# Patient Record
Sex: MALE | Race: WHITE | Employment: FULL TIME | URBAN - METROPOLITAN AREA
[De-identification: names, ages, dates, MRNs, and addresses within clinical notes are randomized per-mention and may not be internally consistent; named-entity substitution may affect disease eponyms.]

---

## 2019-01-03 ENCOUNTER — HOSPITAL ENCOUNTER (EMERGENCY)
Age: 49
Discharge: HOME | End: 2019-01-03
Attending: EMERGENCY MEDICINE
Payer: COMMERCIAL

## 2019-01-03 VITALS
WEIGHT: 200 LBS | DIASTOLIC BLOOD PRESSURE: 115 MMHG | SYSTOLIC BLOOD PRESSURE: 169 MMHG | HEIGHT: 69 IN | HEART RATE: 87 BPM | BODY MASS INDEX: 29.62 KG/M2 | RESPIRATION RATE: 18 BRPM | TEMPERATURE: 98.5 F | OXYGEN SATURATION: 96 %

## 2019-01-03 DIAGNOSIS — S81.811A LACERATION OF RIGHT LOWER EXTREMITY, INITIAL ENCOUNTER: Primary | ICD-10-CM

## 2019-01-03 PROCEDURE — 99283 EMERGENCY DEPT VISIT LOW MDM: CPT

## 2019-01-03 PROCEDURE — 74011000250 HC RX REV CODE- 250: Performed by: PHYSICIAN ASSISTANT

## 2019-01-03 PROCEDURE — 74011250636 HC RX REV CODE- 250/636: Performed by: PHYSICIAN ASSISTANT

## 2019-01-03 PROCEDURE — 77030008460 HC STPLR SKN PRECIS 3M -A

## 2019-01-03 PROCEDURE — 77030018836 HC SOL IRR NACL ICUM -A

## 2019-01-03 PROCEDURE — 90471 IMMUNIZATION ADMIN: CPT

## 2019-01-03 PROCEDURE — 90715 TDAP VACCINE 7 YRS/> IM: CPT | Performed by: PHYSICIAN ASSISTANT

## 2019-01-03 PROCEDURE — 75810000294 HC INTERM/LAYERED WND RPR

## 2019-01-03 PROCEDURE — 77030031139 HC SUT VCRL2 J&J -A

## 2019-01-03 RX ORDER — NAPROXEN 500 MG/1
500 TABLET ORAL 2 TIMES DAILY WITH MEALS
Qty: 20 TAB | Refills: 0 | Status: SHIPPED | OUTPATIENT
Start: 2019-01-03

## 2019-01-03 RX ORDER — LIDOCAINE HYDROCHLORIDE AND EPINEPHRINE 10; 10 MG/ML; UG/ML
10 INJECTION, SOLUTION INFILTRATION; PERINEURAL ONCE
Status: COMPLETED | OUTPATIENT
Start: 2019-01-03 | End: 2019-01-03

## 2019-01-03 RX ORDER — SULFAMETHOXAZOLE AND TRIMETHOPRIM 800; 160 MG/1; MG/1
1 TABLET ORAL 2 TIMES DAILY
Qty: 14 TAB | Refills: 0 | Status: SHIPPED | OUTPATIENT
Start: 2019-01-03 | End: 2019-01-10

## 2019-01-03 RX ADMIN — LIDOCAINE HYDROCHLORIDE AND EPINEPHRINE 100 MG: 10; 10 INJECTION, SOLUTION INFILTRATION; PERINEURAL at 15:50

## 2019-01-03 RX ADMIN — TETANUS TOXOID, REDUCED DIPHTHERIA TOXOID AND ACELLULAR PERTUSSIS VACCINE, ADSORBED 0.5 ML: 5; 2.5; 8; 8; 2.5 SUSPENSION INTRAMUSCULAR at 15:49

## 2019-01-03 NOTE — ED TRIAGE NOTES
Patient presents to the ED with c/o stab wound around 3 pm today to his left thigh. Pt accidentally cut himself with a knife while trying to cut some wires. Pt is unsure if he is up to date on his tetanus. Pt denies taking any medications.

## 2019-01-03 NOTE — ED NOTES
Emergency Department Nursing Plan of Care       The Nursing Plan of Care is developed from the Nursing assessment and Emergency Department Attending provider initial evaluation. The plan of care may be reviewed in the ED Provider note.     The Plan of Care was developed with the following considerations:   Patient / Family readiness to learn indicated by:verbalized understanding  Persons(s) to be included in education: patient  Barriers to Learning/Limitations:No    Signed     Nuvia Wilhelm RN    1/3/2019   4:03 PM

## 2019-01-03 NOTE — ED PROVIDER NOTES
EMERGENCY DEPARTMENT HISTORY AND PHYSICAL EXAM      Date: 1/3/2019  Patient Name: Amado Christina    History of Presenting Illness     Chief Complaint   Patient presents with    Laceration       History Provided By: Patient    HPI: Amado Christina, 46 y.o. male with PMHx significant fo, presents ambulatory to the ED with cc of wound to left thigh that occurred about 30 min PTA. Pt states he was cutting cords with a knife and cut thigh. Denies pain currently. Reports sharp pain with movement. Denies numbness/tingling, weakness. Has not taken anything for sx. There are no other complaints, changes, or physical findings at this time. PCP: Collins Smith NP    No current facility-administered medications on file prior to encounter. No current outpatient medications on file prior to encounter. Past History     Past Medical History:  History reviewed. No pertinent past medical history. Past Surgical History:  History reviewed. No pertinent surgical history. Family History:  History reviewed. No pertinent family history. Social History:  Social History     Tobacco Use    Smoking status: Never Smoker    Smokeless tobacco: Never Used   Substance Use Topics    Alcohol use: Yes    Drug use: No       Allergies:  No Known Allergies      Review of Systems   Review of Systems   Constitutional: Negative for chills and fever. HENT: Negative for facial swelling. Eyes: Negative for photophobia and visual disturbance. Respiratory: Negative for shortness of breath. Cardiovascular: Negative for chest pain. Gastrointestinal: Negative for abdominal pain, nausea and vomiting. Genitourinary: Negative for flank pain. Skin: Positive for wound. Negative for color change, pallor and rash. Neurological: Negative for dizziness, weakness, light-headedness and headaches. All other systems reviewed and are negative.       Physical Exam   Physical Exam   Constitutional: He is oriented to person, place, and time. He appears well-developed and well-nourished. No distress. HENT:   Head: Normocephalic and atraumatic. Eyes: Conjunctivae are normal.   Cardiovascular: Normal rate, regular rhythm and normal heart sounds. Pulmonary/Chest: Effort normal and breath sounds normal. No respiratory distress. Abdominal: Soft. Bowel sounds are normal. There is no tenderness. Musculoskeletal: Normal range of motion. Neurological: He is alert and oriented to person, place, and time. No cranial nerve deficit. Skin: Skin is warm. No rash noted. 4 cm linear laceration to left lateral thigh. 1 cm laceration through vastus lateralis. Bleeding controlled. No FB visualized. Full AROM intact. Sensation intact. Psychiatric: He has a normal mood and affect. His behavior is normal.   Nursing note and vitals reviewed. Diagnostic Study Results     Labs -   No results found for this or any previous visit (from the past 12 hour(s)). Radiologic Studies -   No orders to display     CT Results  (Last 48 hours)    None        CXR Results  (Last 48 hours)    None            Medical Decision Making   I am the first provider for this patient. I reviewed the vital signs, available nursing notes, past medical history, past surgical history, family history and social history. Vital Signs-Reviewed the patient's vital signs. Patient Vitals for the past 12 hrs:   Temp Pulse Resp BP SpO2   01/03/19 1513 98.5 °F (36.9 °C) 87 18 (!) 169/115 96 %         Records Reviewed: Nursing Notes and Old Medical Records    Provider Notes (Medical Decision Making):   DDx: Laceration, Muscle involvement, FB in wound    ED Course:   Initial assessment performed. The patients presenting problems have been discussed, and they are in agreement with the care plan formulated and outlined with them. I have encouraged them to ask questions as they arise throughout their visit.        WOUND REPAIR  Date/Time: 1/3/2019 3:40 PM  Performed by: [id number] provider: Dr. Guevara Jewel  Preparation: skin prepped with Betadine  Location: left lateral thigh. Wound length: 4 cm. Anesthesia: local infiltration    Anesthesia:  Local Anesthetic: lidocaine 1% without epinephrine  Anesthetic total: 8 mL  Foreign bodies: no foreign bodies  Irrigation solution: saline  Irrigation method: jet lavage  Debridement: none  Subcutaneous closure: Vicryl (4-0, 3 sutures)  Number of sutures: 6 staples. Technique: simple and interrupted  Approximation: close  Dressing: 4x4  Patient tolerance: Patient tolerated the procedure well with no immediate complications  My total time at bedside, performing this procedure was 31-45 minutes. Disposition:  Discussed dx and treatment plan. Discussed importance of follow up in 7 days for staple removal. All questions answered. Pt voiced they understood. Return if sx worsen. PLAN:  1. Discharge Medication List as of 1/3/2019  5:26 PM        2. Follow-up Information     Follow up With Specialties Details Why [facility] EMERGENCY DEPT Emergency Medicine In 5 days For staple removal [address] [facility] [address]  [phone]        Return to ED if worse     Diagnosis     Clinical Impression:   1.  Laceration of right lower extremity, initial encounter

## 2019-01-10 ENCOUNTER — HOSPITAL ENCOUNTER (EMERGENCY)
Age: 49
Discharge: HOME | End: 2019-01-10
Attending: EMERGENCY MEDICINE
Payer: COMMERCIAL

## 2019-01-10 VITALS
RESPIRATION RATE: 18 BRPM | HEART RATE: 79 BPM | DIASTOLIC BLOOD PRESSURE: 93 MMHG | BODY MASS INDEX: 29.62 KG/M2 | TEMPERATURE: 98.4 F | WEIGHT: 200 LBS | HEIGHT: 69 IN | SYSTOLIC BLOOD PRESSURE: 152 MMHG | OXYGEN SATURATION: 100 %

## 2019-01-10 DIAGNOSIS — Z48.02 ENCOUNTER FOR STAPLE REMOVAL: Primary | ICD-10-CM

## 2019-01-10 PROCEDURE — 77030008027

## 2019-01-10 PROCEDURE — 75810000275 HC EMERGENCY DEPT VISIT NO LEVEL OF CARE

## 2019-01-10 RX ORDER — CEPHALEXIN 500 MG/1
500 CAPSULE ORAL 3 TIMES DAILY
Qty: 21 CAP | Refills: 0 | Status: SHIPPED | OUTPATIENT
Start: 2019-01-10 | End: 2019-01-17

## 2019-01-10 NOTE — DISCHARGE INSTRUCTIONS
Patient Education        Learning About Stitches and Staples Removal  When are stitches and staples removed? Your doctor will tell you when to have your stitches or staples removed, usually in 7 to 14 days. How long you'll be told to wait will depend on things like where the wound is located, how big and how deep the wound is, and what your general health is like. Do not remove the stitches on your own. Stitches on the face are usually removed within a week. But stitches and staples on other areas of the body, such as on the back or belly or over a joint, may need to stay in place longer, often a week or two. Be sure to follow your doctor's instructions. How are stitches and staples removed? It usually doesn't hurt when the doctor removes the stitches or staples. You may feel a tug as each stitch or staple is removed. · You will either be seated or lying down. · To remove stitches, the doctor will use scissors to cut each of the knots and then pull the threads out. · To remove staples, the doctor will use a tool to take out the staples one at a time. · The area may still feel tender after the stitches or staples are gone. But it should feel better within a few minutes or up to a few hours. What can you expect after stitches and staples are removed? Depending on the type and location of the cut, you will have a scar. Scars usually fade over time. Keep the area clean, but you won't need a bandage. When should you call for help? Call your doctor now or seek immediate medical care if :  · You have new pain, or your pain gets worse. · You have trouble moving the area near the scar. · You have symptoms of infection, such as:  ? Increased pain, swelling, warmth, or redness around the scar. ? Red streaks leading from the scar. ? Pus draining from the scar. ? A fever. Watch closely for changes in your health, and be sure to contact your doctor if:  · The scar opens.   · You do not get better as expected. Follow-up care is a key part of your treatment and safety. Be sure to make and go to all appointments, and call your doctor if you do not get better as expected. It's also a good idea to keep a list of the medicines you take. Where can you learn more? Go to http://ginette-kofi.info/. Enter B110 in the search box to learn more about \"Learning About Stitches and Staples Removal.\"  Current as of: November 20, 2017  Content Version: 11.8  © 0496-5194 [employer]. Care instructions adapted under license by [employer] (which disclaims liability or warranty for this information). If you have questions about a medical condition or this instruction, always ask your healthcare professional. [address] any warranty or liability for your use of this information.

## 2019-01-10 NOTE — ED PROVIDER NOTES
EMERGENCY DEPARTMENT HISTORY AND PHYSICAL EXAM    Date: 1/10/2019  Patient Name: Sheryle Bonus    History of Presenting Illness     Chief Complaint   Patient presents with    Staple Removal     left leg         History Provided By: Patient    HPI: Sheryle Bonus is a 46 y.o. male with a PMH of \"psychiatric history\" who presents with staple removal. Pt sustained a laceration to L thigh 7 days ago. He received internal sutures and staples on the outside to close laceration. Pt reports wound healing well without drainage, pain or warmth. States the redness began today. Denies fever or chills. He has not taken his bactrim stating that it cause stomach upset. PCP: Emily Izaguirre NP    Current Outpatient Medications   Medication Sig Dispense Refill    cephALEXin (KEFLEX) 500 mg capsule Take 1 Cap by mouth three (3) times daily for 7 days. 21 Cap 0    naproxen (NAPROSYN) 500 mg tablet Take 1 Tab by mouth two (2) times daily (with meals). 20 Tab 0       Past History     Past Medical History:  No past medical history on file. Past Surgical History:  No past surgical history on file. Family History:  No family history on file. Social History:  Social History     Tobacco Use    Smoking status: Never Smoker    Smokeless tobacco: Never Used   Substance Use Topics    Alcohol use: Yes    Drug use: No       Allergies:  No Known Allergies      Review of Systems   Review of Systems   Constitutional: Negative for chills and fever. Respiratory: Negative for cough. Cardiovascular: Negative for chest pain. Musculoskeletal: Negative for arthralgias. Skin: Positive for color change and wound. Negative for pallor. Neurological: Negative for numbness. All other systems reviewed and are negative.       Physical Exam     Vitals:    01/10/19 1454   BP: (!) 152/93   Pulse: 79   Resp: 18   Temp: 98.4 °F (36.9 °C)   SpO2: 100%   Weight: 90.7 kg (200 lb)   Height: 5' 9\" (1.753 m)     Physical Exam   Constitutional: He is oriented to person, place, and time. He appears well-developed and well-nourished. No distress. HENT:   Head: Normocephalic and atraumatic. Eyes: Conjunctivae and EOM are normal. Pupils are equal, round, and reactive to light. Neck: Normal range of motion. Neck supple. Cardiovascular: Normal rate, regular rhythm and normal heart sounds. Pulmonary/Chest: Effort normal and breath sounds normal.   Neurological: He is alert and oriented to person, place, and time. Skin: Skin is warm and dry. Psychiatric: He has a normal mood and affect. Thought content normal.   Nursing note and vitals reviewed. Diagnostic Study Results     Labs -   No results found for this or any previous visit (from the past 12 hour(s)). Radiologic Studies -   No orders to display     CT Results  (Last 48 hours)    None        CXR Results  (Last 48 hours)    None            Medical Decision Making   I am the first provider for this patient. I reviewed the vital signs, available nursing notes, past medical history, past surgical history, family history and social history. Vital Signs-Reviewed the patient's vital signs. Records Reviewed: Nursing Notes and Old Medical Records            Disposition:  Discharge     DISCHARGE NOTE:   3:29 PM      Care plan outlined and precautions discussed. Patient has no new complaints, changes, or physical findings. All medications were reviewed with the patient; will d/c home with keflex. All of pt's questions and concerns were addressed. Patient was instructed and agrees to follow up with PCP, as well as to return to the ED upon further deterioration. Patient is ready to go home.     Follow-up Information     Follow up With Specialties Details Why Contact Liz Rosario NP Nurse Practitioner Schedule an appointment as soon as possible for a visit If symptoms worsen [address]  [phone]            Current Discharge Medication List      START taking these medications    Details   cephALEXin (KEFLEX) 500 mg capsule Take 1 Cap by mouth three (3) times daily for 7 days. Qty: 21 Cap, Refills: 0         CONTINUE these medications which have NOT CHANGED    Details   naproxen (NAPROSYN) 500 mg tablet Take 1 Tab by mouth two (2) times daily (with meals). Qty: 20 Tab, Refills: 0         STOP taking these medications       trimethoprim-sulfamethoxazole (BACTRIM DS) 160-800 mg per tablet Comments:   Reason for Stopping:               Provider Notes (Medical Decision Making):   DDX: staple removal, cellulitis, delayed wound healing, dehiscence     Procedures:  Suture/Staple Removal  Date/Time: 1/10/2019 3:27 PM  Performed by: Georgia Mills NP  Authorized by: Georgia Mills NP     Consent:     Consent obtained:  Verbal    Consent given by:  Patient    Risks discussed:  Bleeding, pain and wound separation    Alternatives discussed:  No treatment  Location:     Location:  Lower extremity    Lower extremity location:  Leg    Leg location:  L upper leg (L lateral thigh)  Procedure details:     Wound appearance:  Red    Number of staples removed:  6  Post-procedure details:     Post-removal:  Steri-Strips applied (3)    Patient tolerance of procedure: Tolerated well, no immediate complications  Comments:      Mild dehiscence noted after staples were removed. Discussed steri strips use with pt. Diagnosis     Clinical Impression:   1.  Encounter for staple removal

## 2021-05-17 ENCOUNTER — TELEPHONE (OUTPATIENT)
Dept: SLEEP MEDICINE | Age: 51
End: 2021-05-17